# Patient Record
Sex: MALE | Race: OTHER | Employment: FULL TIME | URBAN - METROPOLITAN AREA
[De-identification: names, ages, dates, MRNs, and addresses within clinical notes are randomized per-mention and may not be internally consistent; named-entity substitution may affect disease eponyms.]

---

## 2017-01-30 ENCOUNTER — OFFICE VISIT (OUTPATIENT)
Dept: FAMILY MEDICINE CLINIC | Age: 57
End: 2017-01-30

## 2017-01-30 VITALS
DIASTOLIC BLOOD PRESSURE: 72 MMHG | BODY MASS INDEX: 28.33 KG/M2 | SYSTOLIC BLOOD PRESSURE: 122 MMHG | WEIGHT: 179.8 LBS | OXYGEN SATURATION: 96 % | TEMPERATURE: 97.2 F | HEART RATE: 82 BPM

## 2017-01-30 DIAGNOSIS — F41.9 ANXIETY: ICD-10-CM

## 2017-01-30 DIAGNOSIS — R10.11 RUQ ABDOMINAL PAIN: ICD-10-CM

## 2017-01-30 DIAGNOSIS — K57.10 DUODENAL DIVERTICULUM: ICD-10-CM

## 2017-01-30 DIAGNOSIS — K82.4 GALLBLADDER POLYP: ICD-10-CM

## 2017-01-30 DIAGNOSIS — H91.92 HEARING LOSS, LEFT: ICD-10-CM

## 2017-01-30 DIAGNOSIS — H61.22 IMPACTED CERUMEN OF LEFT EAR: Primary | ICD-10-CM

## 2017-01-30 DIAGNOSIS — R10.11 COLICKY RUQ ABDOMINAL PAIN: Primary | ICD-10-CM

## 2017-01-30 PROCEDURE — 99214 OFFICE O/P EST MOD 30 MIN: CPT | Performed by: FAMILY MEDICINE

## 2017-01-30 ASSESSMENT — ENCOUNTER SYMPTOMS
COUGH: 0
ABDOMINAL PAIN: 1
BACK PAIN: 0

## 2017-02-17 RX ORDER — LEVOTHYROXINE SODIUM 112 UG/1
TABLET ORAL
Qty: 90 TABLET | Refills: 2 | Status: SHIPPED | OUTPATIENT
Start: 2017-02-17

## 2017-02-27 ENCOUNTER — OFFICE VISIT (OUTPATIENT)
Dept: FAMILY MEDICINE CLINIC | Age: 57
End: 2017-02-27

## 2017-02-27 VITALS
TEMPERATURE: 96.8 F | SYSTOLIC BLOOD PRESSURE: 118 MMHG | OXYGEN SATURATION: 95 % | HEART RATE: 70 BPM | WEIGHT: 183.8 LBS | BODY MASS INDEX: 28.96 KG/M2 | DIASTOLIC BLOOD PRESSURE: 62 MMHG

## 2017-02-27 DIAGNOSIS — E86.0 DEHYDRATION, MILD: ICD-10-CM

## 2017-02-27 DIAGNOSIS — R51.9 ACUTE NONINTRACTABLE HEADACHE, UNSPECIFIED HEADACHE TYPE: Primary | ICD-10-CM

## 2017-02-27 PROCEDURE — 99213 OFFICE O/P EST LOW 20 MIN: CPT | Performed by: FAMILY MEDICINE

## 2017-02-27 RX ORDER — ASPIRIN 81 MG/1
TABLET ORAL
Qty: 30 TABLET | Refills: 3
Start: 2017-02-27

## 2017-02-27 ASSESSMENT — ENCOUNTER SYMPTOMS
COUGH: 0
BACK PAIN: 0
CONSTIPATION: 1
ABDOMINAL PAIN: 0

## 2017-04-24 ENCOUNTER — OFFICE VISIT (OUTPATIENT)
Dept: FAMILY MEDICINE CLINIC | Age: 57
End: 2017-04-24

## 2017-04-24 VITALS
BODY MASS INDEX: 27.67 KG/M2 | TEMPERATURE: 97.2 F | DIASTOLIC BLOOD PRESSURE: 66 MMHG | WEIGHT: 175.6 LBS | OXYGEN SATURATION: 96 % | SYSTOLIC BLOOD PRESSURE: 118 MMHG | HEART RATE: 83 BPM

## 2017-04-24 DIAGNOSIS — M79.2 TRIGGER POINT WITH NEURALGIA: ICD-10-CM

## 2017-04-24 DIAGNOSIS — F41.9 ANXIETY: ICD-10-CM

## 2017-04-24 DIAGNOSIS — M54.6 BILATERAL THORACIC BACK PAIN, UNSPECIFIED CHRONICITY: ICD-10-CM

## 2017-04-24 DIAGNOSIS — R07.81 RIB PAIN ON RIGHT SIDE: Primary | ICD-10-CM

## 2017-04-24 PROCEDURE — 99213 OFFICE O/P EST LOW 20 MIN: CPT | Performed by: FAMILY MEDICINE

## 2017-04-24 RX ORDER — CLONAZEPAM 0.5 MG/1
0.5 TABLET ORAL 2 TIMES DAILY PRN
Qty: 180 TABLET | Refills: 1 | Status: SHIPPED | OUTPATIENT
Start: 2017-04-24

## 2017-04-24 RX ORDER — CYCLOBENZAPRINE HCL 5 MG
TABLET ORAL
Qty: 20 TABLET | Refills: 3 | Status: SHIPPED | OUTPATIENT
Start: 2017-04-24

## 2017-04-24 ASSESSMENT — ENCOUNTER SYMPTOMS
BACK PAIN: 1
ABDOMINAL PAIN: 0
COUGH: 0

## 2017-06-07 ENCOUNTER — OFFICE VISIT (OUTPATIENT)
Dept: FAMILY MEDICINE CLINIC | Age: 57
End: 2017-06-07

## 2017-06-07 VITALS
TEMPERATURE: 97 F | OXYGEN SATURATION: 97 % | DIASTOLIC BLOOD PRESSURE: 78 MMHG | HEART RATE: 87 BPM | BODY MASS INDEX: 27.95 KG/M2 | WEIGHT: 177.4 LBS | SYSTOLIC BLOOD PRESSURE: 122 MMHG
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
## 2017-06-07 DIAGNOSIS — S06.0X9S CONCUSSION, WITH LOSS OF CONSCIOUSNESS OF UNSPECIFIED DURATION, SEQUELA: Primary | ICD-10-CM

## 2017-06-07 PROCEDURE — 99213 OFFICE O/P EST LOW 20 MIN: CPT | Performed by: FAMILY MEDICINE

## 2022-08-15 LAB
A/G RATIO: 1.7 (ref 1–2)
ALBUMIN SERPL-MCNC: 4.1 G/DL (ref 3.5–5.7)
ALBUMIN/PROTEIN TOTAL, SER/PL: 6.5 G/DL (ref 6–8.3)
ALP BLD-CCNC: 42 U/L (ref 34–104)
ALT SERPL-CCNC: 16 U/L (ref 7–52)
ANION GAP 4: 8 MMOL/L (ref 7–16)
AST W/O P-5'-P: 19 U/L (ref 13–39)
BASOPHILS # BLD: 0.8 %
BASOPHILS ABSOLUTE: 0 K/UL (ref 0–0.1)
BILIRUB SERPL-MCNC: 0.3 MG/DL (ref 0.3–1)
BUN BLDV-MCNC: 22 MG/DL (ref 7–25)
CALCIUM SERPL-MCNC: 9.1 MG/DL (ref 8.6–10.2)
CHLORIDE BLD-SCNC: 107 MMOL/L (ref 98–107)
CHOLESTEROL, STONE: 173 MG/DL
CO2: 24 MMOL/L (ref 21–31)
CREATININE + EGFR PANEL: 1 MG/DL (ref 0.7–1.3)
EOSINOPHILS ABSOLUTE: 0.2 K/UL (ref 0–0.4)
EOSINOPHILS RELATIVE PERCENT: 3.2 %
GFR CALCULATED: > 60 ML/MIN/1.73M2
GFR CALCULATED: > 60 ML/MIN/1.73M2
GLOBULIN: 2.4 G/DL (ref 2.6–4.2)
GLUCOSE: 95 MG/DL (ref 74–109)
HDLC SERPL-MCNC: 50 MG/DL (ref 40–60)
HEMOGLOBIN URINE, QUAL: 13.5 G/DL (ref 13.1–17.6)
LDL CHOLESTEROL CALCULATED: 63 MG/DL (ref 0–99)
LYMPHOCYTES # BLD: 28.3 %
LYMPHOCYTES ABSOLUTE: 1.7 K/UL (ref 0.8–3.6)
MCH RBC QN AUTO: 30.4 PG (ref 28.4–33.4)
MCHC RBC AUTO-ENTMCNC: 35.1 G/DL (ref 31.1–37)
MCV RBC AUTO: 86.6 FL (ref 85–99)
MONOCYTES # BLD: 10.6 %
MONOCYTES ABSOLUTE: 0.6 K/UL (ref 0.3–0.9)
NEUTROPHILS ABSOLUTE: 3.3 K/UL (ref 2–7.3)
NEUTROPHILS/100 LEUKOCYTES: 57.1 %
PDW BLD-RTO: 13.4 % (ref 11.7–15.2)
PLATELET COUNT MANUAL: 238 K/UL (ref 154–393)
POTASSIUM SERPL-SCNC: 4.3 MMOL/L (ref 3.5–5.3)
PROSTATE SPECIFIC ANTIGEN: 2.51 NG/ML (ref 0.01–4)
RBC # BLD: 4.43 M/UL (ref 4.3–5.86)
SODIUM BLD-SCNC: 139 MMOL/L (ref 136–145)
SR HEMATOCRIT: 38.4 % (ref 39–51.5)
T4 FREE: 0.79 NG/DL (ref 0.61–1.12)
TRIGL SERPL-MCNC: 299 MG/DL
VITAMIN B-12: 337 PG/ML (ref 180–914)
VITAMIN D 25-HYDROXY: 31.3 NG/ML
VLDLC SERPL CALC-MCNC: 60 MG/DL (ref 0–40)
WBC BLOOD, MANUAL: 5.8 K/UL (ref 4–10.5)

## 2022-10-23 LAB — PROSTATE SPECIFIC ANTIGEN: 1.74 NG/ML (ref 0.01–4)

## 2023-02-28 LAB
TESTOSTERONE FREE-MALE: 6.9 PG/ML (ref 6.6–18.1)
TESTOSTERONE TOTAL-MALE: 608 NG/DL (ref 264–916)

## 2023-03-20 LAB
BACTERIA: NORMAL
BILIRUBIN URINE: NEGATIVE
BLOOD, URINE: NEGATIVE
CASTS: NORMAL /LPF
CLARITY: CLEAR
COLOR: YELLOW
CULTURE: NORMAL
CULTURE: NORMAL
EPITHELIAL CELLS, UA: NORMAL /HPF (ref 0–10)
GLUCOSE URINE: NEGATIVE
KETONES, URINE: NEGATIVE
LEUKOCYTE ESTERASE, URINE: NEGATIVE
LEUKOCYTES, UA: NORMAL /HPF (ref 0–5)
MICROSCOPIC EXAMINATION: NORMAL
MICROSCOPIC EXAMINATION: NORMAL
NITRITE, URINE: NEGATIVE
PH UA: 6.5 (ref 5–7.5)
PROTEIN UA: NEGATIVE
RBC UA: NORMAL /HPF (ref 0–2)
SPECIFIC GRAVITY UA: 1.01 (ref 1–1.03)
UROBILINOGEN, URINE: 0.2 MG/DL (ref 0.2–1)

## 2023-08-07 LAB
A/G RATIO: 1.5 (ref 1–2)
ALBUMIN SERPL-MCNC: 4.1 G/DL (ref 3.5–5.7)
ALBUMIN/PROTEIN TOTAL, SER/PL: 6.9 G/DL (ref 6–8.3)
ALP BLD-CCNC: 44 U/L (ref 34–104)
ALT SERPL-CCNC: 16 U/L (ref 7–52)
ANION GAP 4: 6 MMOL/L (ref 7–16)
AST W/O P-5'-P: 19 U/L (ref 13–39)
BASOPHILS # BLD: 0.4 %
BASOPHILS ABSOLUTE: 0 K/UL (ref 0–0.1)
BILIRUB SERPL-MCNC: 0.6 MG/DL (ref 0.3–1)
BUN BLDV-MCNC: 13 MG/DL (ref 7–25)
CALCIUM SERPL-MCNC: 9.6 MG/DL (ref 8.6–10.2)
CHLORIDE BLD-SCNC: 104 MMOL/L (ref 98–107)
CHOLESTEROL, STONE: 157 MG/DL
CO2: 29 MMOL/L (ref 21–31)
CREAT SERPL-MCNC: 1.18 MG/DL (ref 0.7–1.3)
EGFR MALE: 70 ML/MIN/1.73M2
EOSINOPHILS ABSOLUTE: 0.2 K/UL (ref 0–0.4)
EOSINOPHILS RELATIVE PERCENT: 2.8 %
GLOBULIN: 2.8 G/DL (ref 2.6–4.2)
GLUCOSE: 101 MG/DL (ref 74–109)
HDLC SERPL-MCNC: 67 MG/DL (ref 40–60)
HEMOGLOBIN URINE, QUAL: 14.4 G/DL (ref 13.1–17.6)
LDL CHOLESTEROL CALCULATED: 70 MG/DL (ref 0–99)
LYMPHOCYTES # BLD: 29.4 %
LYMPHOCYTES ABSOLUTE: 1.6 K/UL (ref 0.8–3.6)
MCH RBC QN AUTO: 31.7 PG (ref 28.4–33.4)
MCHC RBC AUTO-ENTMCNC: 35.7 G/DL (ref 31.1–37)
MCV RBC AUTO: 88.7 FL (ref 85–99)
MONOCYTES # BLD: 10.9 %
MONOCYTES ABSOLUTE: 0.6 K/UL (ref 0.3–0.9)
NEUTROPHILS ABSOLUTE: 3.1 K/UL (ref 2–7.3)
NEUTROPHILS/100 LEUKOCYTES: 56.5 %
PDW BLD-RTO: 13.5 % (ref 11.7–15.2)
PLATELET COUNT MANUAL: 233 K/UL (ref 154–393)
POTASSIUM SERPL-SCNC: 4.3 MMOL/L (ref 3.5–5.1)
RBC # BLD: 4.56 M/UL (ref 4.3–5.86)
SODIUM BLD-SCNC: 139 MMOL/L (ref 136–145)
SR HEMATOCRIT: 40.4 % (ref 39–51.5)
T4 FREE: 0.86 NG/DL (ref 0.61–1.12)
TRIGL SERPL-MCNC: 98 MG/DL
TSH ULTRASENSITIVE: 2.86 UIU/ML (ref 0.45–5.33)
VITAMIN B-12: 387 PG/ML (ref 180–914)
VLDLC SERPL CALC-MCNC: 20 MG/DL (ref 0–40)
WBC BLOOD, MANUAL: 5.6 K/UL (ref 4–10.5)

## 2025-05-25 ENCOUNTER — HOSPITAL ENCOUNTER (EMERGENCY)
Age: 65
Discharge: HOME | End: 2025-05-25
Attending: EMERGENCY MEDICINE
Payer: OTHER GOVERNMENT

## 2025-05-25 VITALS
HEIGHT: 68 IN | BODY MASS INDEX: 23.19 KG/M2 | TEMPERATURE: 98.3 F | HEART RATE: 70 BPM | WEIGHT: 153 LBS | SYSTOLIC BLOOD PRESSURE: 151 MMHG | RESPIRATION RATE: 18 BRPM | DIASTOLIC BLOOD PRESSURE: 80 MMHG | OXYGEN SATURATION: 100 %

## 2025-05-25 DIAGNOSIS — M79.605 LEFT LEG PAIN: Primary | ICD-10-CM

## 2025-05-25 LAB
ANION GAP SERPL CALCULATED.3IONS-SCNC: 10 MMOL/L (ref 3–16)
BASOPHILS # BLD: 0.03 K/UL (ref 0–0.2)
BASOPHILS NFR BLD: 1 %
BUN SERPL-MCNC: 19 MG/DL (ref 7–20)
CALCIUM SERPL-MCNC: 9.6 MG/DL (ref 8.3–10.6)
CHLORIDE SERPL-SCNC: 98 MMOL/L (ref 99–110)
CO2 SERPL-SCNC: 28 MMOL/L (ref 21–32)
CREAT SERPL-MCNC: 1 MG/DL (ref 0.8–1.3)
D DIMER PPP FEU-MCNC: 0.35 UG/ML FEU (ref 0–0.6)
EOSINOPHIL # BLD: 0.26 K/UL (ref 0–0.6)
EOSINOPHILS RELATIVE PERCENT: 5 %
ERYTHROCYTE [DISTWIDTH] IN BLOOD BY AUTOMATED COUNT: 12.5 % (ref 12.4–15.4)
GFR, ESTIMATED: 82 ML/MIN/1.73M2
GLUCOSE SERPL-MCNC: 98 MG/DL (ref 70–99)
HCT VFR BLD AUTO: 40 % (ref 40.5–52.5)
HGB BLD-MCNC: 14.3 G/DL (ref 13.5–17.5)
IMM GRANULOCYTES # BLD AUTO: 0 K/UL (ref 0–0.5)
IMM GRANULOCYTES NFR BLD: 0 %
LYMPHOCYTES NFR BLD: 1.87 K/UL (ref 1–5.1)
LYMPHOCYTES RELATIVE PERCENT: 33 %
MCH RBC QN AUTO: 30.7 PG (ref 26–34)
MCHC RBC AUTO-ENTMCNC: 35.8 G/DL (ref 31–36)
MCV RBC AUTO: 85.8 FL (ref 80–100)
MONOCYTES NFR BLD: 0.53 K/UL (ref 0–1.3)
MONOCYTES NFR BLD: 9 %
NEUTROPHILS NFR BLD: 53 %
NEUTS SEG NFR BLD: 3.06 K/UL (ref 1.7–7.7)
PLATELET # BLD AUTO: 213 K/UL (ref 135–450)
PMV BLD AUTO: 8.9 FL
POTASSIUM SERPL-SCNC: 3.9 MMOL/L (ref 3.5–5.1)
RBC # BLD AUTO: 4.66 M/UL (ref 4.2–5.9)
SODIUM SERPL-SCNC: 136 MMOL/L (ref 136–145)
WBC OTHER # BLD: 5.8 K/UL (ref 4–11)

## 2025-05-25 PROCEDURE — 85379 FIBRIN DEGRADATION QUANT: CPT

## 2025-05-25 PROCEDURE — 99283 EMERGENCY DEPT VISIT LOW MDM: CPT

## 2025-05-25 PROCEDURE — 80048 BASIC METABOLIC PNL TOTAL CA: CPT

## 2025-05-25 PROCEDURE — 85025 COMPLETE CBC W/AUTO DIFF WBC: CPT

## 2025-05-25 RX ORDER — TADALAFIL 5 MG/1
5 TABLET ORAL PRN
COMMUNITY

## 2025-05-25 RX ORDER — TRAZODONE HYDROCHLORIDE 50 MG/1
75 TABLET ORAL NIGHTLY
COMMUNITY

## 2025-05-25 RX ORDER — LORATADINE 10 MG/1
10 TABLET ORAL PRN
COMMUNITY

## 2025-05-25 RX ORDER — CLONAZEPAM 0.5 MG/1
0.5 TABLET ORAL NIGHTLY PRN
COMMUNITY
Start: 2024-06-13

## 2025-05-25 ASSESSMENT — PAIN DESCRIPTION - ORIENTATION: ORIENTATION: LEFT

## 2025-05-25 ASSESSMENT — PAIN DESCRIPTION - DESCRIPTORS: DESCRIPTORS: DULL;ACHING

## 2025-05-25 ASSESSMENT — PAIN DESCRIPTION - LOCATION: LOCATION: LEG

## 2025-05-25 ASSESSMENT — PAIN - FUNCTIONAL ASSESSMENT: PAIN_FUNCTIONAL_ASSESSMENT: 0-10

## 2025-05-25 ASSESSMENT — PAIN SCALES - GENERAL: PAINLEVEL_OUTOF10: 3

## 2025-05-26 NOTE — ED PROVIDER NOTES
assessment, I estimate the risk of hospitalization to be greater than risk of treatment at home. I have explained to the patient that the risk could rapidly change, given precautions for return and instructions. Explained to patient that the risk for mortality is low based on demographic, history and clinical factors.     I discussed with patient the results of evaluation in the ED, diagnosis, care, and prognosis.  The plan is to discharge to home.  Patient is in agreement with plan and questions have been answered.      I also discussed with patient the reasons which may require a return visit and the importance of follow-up care.  The patient is well-appearing, nontoxic, and improved at the time of discharge.  Patient agrees to call to arrange follow-up care as directed.   Patient understands to return immediately for worsening/change in symptoms.      I PERSONALLY SAW THE PATIENT AND PERFORMED A SUBSTANTIVE PORTION OF THE VISIT INCLUDING ALL ASPECTS OF THE MEDICAL DECISION MAKING PROCESS.    The primary clinician of record Benny Harrell     CRITICAL CARE TIME   Total Critical Caretime was 0 minutes, excluding separately reportable procedures.  There was a high probability of clinically significant/life threatening deterioration in the patient's condition which required my urgent intervention.      CRITICAL CARE  I personally saw the patient and independently provided 0 minutes of non-concurrent critical care out of the total shared critical care time provided. This excludes seperately billable procedures. Critical care time was provided for patient as above that required close evaluation and/or intervention with concern for potential patient decompensation.    PROCEDURES:  Unlessotherwise noted below, none    FINAL IMPRESSION      1. Left leg pain          DISPOSITION/PLAN   DISPOSITION Decision To Discharge 05/25/2025 09:00:52 PM    PATIENT REFERRED TO:  Martha Lopez DO  [address]  [place]